# Patient Record
Sex: FEMALE | Race: WHITE | HISPANIC OR LATINO
[De-identification: names, ages, dates, MRNs, and addresses within clinical notes are randomized per-mention and may not be internally consistent; named-entity substitution may affect disease eponyms.]

---

## 2022-05-13 ENCOUNTER — APPOINTMENT (OUTPATIENT)
Dept: OBGYN | Facility: CLINIC | Age: 47
End: 2022-05-13

## 2022-05-18 ENCOUNTER — APPOINTMENT (OUTPATIENT)
Dept: OBGYN | Facility: CLINIC | Age: 47
End: 2022-05-18
Payer: COMMERCIAL

## 2022-05-18 VITALS
DIASTOLIC BLOOD PRESSURE: 64 MMHG | HEIGHT: 65 IN | SYSTOLIC BLOOD PRESSURE: 120 MMHG | BODY MASS INDEX: 41.65 KG/M2 | WEIGHT: 250 LBS

## 2022-05-18 DIAGNOSIS — R23.2 FLUSHING: ICD-10-CM

## 2022-05-18 DIAGNOSIS — Z12.4 ENCOUNTER FOR SCREENING FOR MALIGNANT NEOPLASM OF CERVIX: ICD-10-CM

## 2022-05-18 DIAGNOSIS — N81.6 RECTOCELE: ICD-10-CM

## 2022-05-18 DIAGNOSIS — R63.5 ABNORMAL WEIGHT GAIN: ICD-10-CM

## 2022-05-18 LAB
BILIRUB UR QL STRIP: NORMAL
CLARITY UR: CLEAR
COLLECTION METHOD: NORMAL
GLUCOSE UR-MCNC: NORMAL
HCG UR QL: 0.2 EU/DL
HGB UR QL STRIP.AUTO: NORMAL
KETONES UR-MCNC: NORMAL
LEUKOCYTE ESTERASE UR QL STRIP: NORMAL
NITRITE UR QL STRIP: NORMAL
PH UR STRIP: 6
PROT UR STRIP-MCNC: NORMAL
SP GR UR STRIP: 1

## 2022-05-18 PROCEDURE — 81003 URINALYSIS AUTO W/O SCOPE: CPT | Mod: QW

## 2022-05-18 PROCEDURE — 99072 ADDL SUPL MATRL&STAF TM PHE: CPT

## 2022-05-18 PROCEDURE — 99204 OFFICE O/P NEW MOD 45 MIN: CPT

## 2022-05-18 NOTE — HISTORY OF PRESENT ILLNESS
[LMP unknown] : LMP unknown [N] : Patient does not use contraception [Y] : Positive pregnancy history [Currently Active] : currently active [Men] : men [No] : No [Patient refuses STI testing] : Patient refuses STI testing [PGHxTotal] : 3 [[facility] : [facility] [FreeTextEntry1] : 2016

## 2022-05-18 NOTE — PHYSICAL EXAM
[Chaperone Present] : A chaperone was present in the examining room during all aspects of the physical examination [Appropriately responsive] : appropriately responsive [Alert] : alert [No Acute Distress] : no acute distress [Soft] : soft [Non-tender] : non-tender [Non-distended] : non-distended [Oriented x3] : oriented x3 [Labia Majora] : normal [Labia Minora] : normal [Normal] : normal [Rectocele] : a rectocele [Absent] : absent [Uterine Adnexae] : normal [FreeTextEntry1] : Hafsa Yao [FreeTextEntry4] : rectocele grade 1. no vaginal bulging or severe descent.

## 2022-05-18 NOTE — DISCUSSION/SUMMARY
[FreeTextEntry1] : Exam as above. Patient declined breast exam. \par Discussed:\par 1. relatively low grade rectocele. Discussed that if she is overall asymptomatic no management except for weight loss and kegel exercises is required. If she becomes symptomatic, discussed pessary and surgical intervention. No current need for surgical intervention. \par 2. DIscussed VMS symptoms. Patient currently asymptomatic. Discussed indications for blood work and discussed that FSH, even if mildly elevated, will not provide information as to wether she is menopausal or when she is expected to be menopausal. Discussed that very high levels of FSH can give a diagnosis of menopause, but that treatment should be based on symptoms. Patient is currently asymptomatic. If she becomes symptomatic - will review management options. \par 3. discussed weight gain with the patient. Reviewed portion control, decreased fat in the diet and increased intake of vegetables and proteins. Discussed complex carbs. \par All questions were answered. \par Mammogram will be sent to the patient's facility of choice. \par If patient becomes symptomatic again (VMS, prolapse) will come in

## 2022-05-23 LAB — CYTOLOGY CVX/VAG DOC THIN PREP: NORMAL

## 2022-05-24 LAB — HPV HIGH+LOW RISK DNA PNL CVX: NOT DETECTED

## 2023-01-23 ENCOUNTER — TRANSCRIPTION ENCOUNTER (OUTPATIENT)
Age: 48
End: 2023-01-23